# Patient Record
Sex: FEMALE | Race: BLACK OR AFRICAN AMERICAN | NOT HISPANIC OR LATINO | Employment: STUDENT | URBAN - METROPOLITAN AREA
[De-identification: names, ages, dates, MRNs, and addresses within clinical notes are randomized per-mention and may not be internally consistent; named-entity substitution may affect disease eponyms.]

---
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
## 2023-02-21 PROCEDURE — 87491 CHLMYD TRACH DNA AMP PROBE: CPT

## 2023-02-21 PROCEDURE — 87660 TRICHOMONAS VAGIN DIR PROBE: CPT

## 2023-02-21 PROCEDURE — 87480 CANDIDA DNA DIR PROBE: CPT

## 2023-02-21 PROCEDURE — 87591 N.GONORRHOEAE DNA AMP PROB: CPT

## 2023-02-21 PROCEDURE — 87510 GARDNER VAG DNA DIR PROBE: CPT

## 2023-02-23 ENCOUNTER — TELEPHONE (OUTPATIENT)
Dept: URGENT CARE | Facility: CLINIC | Age: 18
End: 2023-02-23
Payer: OTHER GOVERNMENT

## 2023-02-23 DIAGNOSIS — B96.89 BACTERIAL VAGINOSIS: Primary | ICD-10-CM

## 2023-02-23 DIAGNOSIS — N76.0 BACTERIAL VAGINOSIS: Primary | ICD-10-CM

## 2023-02-23 RX ORDER — METRONIDAZOLE 500 MG/1
500 TABLET ORAL 2 TIMES DAILY
Qty: 14 TABLET | Refills: 0 | Status: SHIPPED | OUTPATIENT
Start: 2023-02-23 | End: 2023-03-02

## 2023-02-23 NOTE — TELEPHONE ENCOUNTER
Patient returned call.  Verified patient's identity with date of birth.  I explained the patient that her vaginal swab was positive for Gardnerella vaginalis.  This is an overgrowth of bacteria in the vagina that needs to be treated with an antibiotic.  Patient denies any antibiotic allergies.  I informed her that I will send a prescription of Flagyl 500 mg twice daily for 7 days to her pharmacy on file.  Advised patient to take the prescription in full and follow-up with her primary care provider or the referral that was made to gynecology.  Also inform patient that chlamydia and gonorrhea test were negative.  Candida was negative.  Trichomonas was negative.  Informed patient that testing for HIV, syphilis, and HPV are not performed here at the urgent care and she should see either her primary care provider for follow-up with the gynecology referral to have these tests.  Discussed risk for increasing symptoms and when to seek further care.  F/U with primary care provider discussed.  ER precautions discussed with the patient.  All questions answered and patient verbalized understanding of information.

## 2025-01-31 ENCOUNTER — APPOINTMENT (OUTPATIENT)
Dept: ULTRASOUND IMAGING | Facility: HOSPITAL | Age: 20
End: 2025-01-31
Payer: MEDICAID

## 2025-01-31 ENCOUNTER — HOSPITAL ENCOUNTER (EMERGENCY)
Facility: HOSPITAL | Age: 20
Discharge: HOME | End: 2025-01-31
Attending: EMERGENCY MEDICINE
Payer: MEDICAID

## 2025-01-31 VITALS
BODY MASS INDEX: 23.11 KG/M2 | OXYGEN SATURATION: 99 % | RESPIRATION RATE: 16 BRPM | HEIGHT: 64 IN | WEIGHT: 135.36 LBS | SYSTOLIC BLOOD PRESSURE: 116 MMHG | HEART RATE: 70 BPM | DIASTOLIC BLOOD PRESSURE: 66 MMHG | TEMPERATURE: 98.5 F

## 2025-01-31 DIAGNOSIS — N83.201 CYST OF RIGHT OVARY: Primary | ICD-10-CM

## 2025-01-31 DIAGNOSIS — N93.9 VAGINAL BLEEDING: ICD-10-CM

## 2025-01-31 LAB
BASOPHILS # BLD AUTO: 0.02 10*3/MM3 (ref 0–0.2)
BASOPHILS NFR BLD AUTO: 0.2 % (ref 0–1.5)
DEPRECATED RDW RBC AUTO: 38 FL (ref 37–54)
EOSINOPHIL # BLD AUTO: 0.05 10*3/MM3 (ref 0–0.4)
EOSINOPHIL NFR BLD AUTO: 0.4 % (ref 0.3–6.2)
ERYTHROCYTE [DISTWIDTH] IN BLOOD BY AUTOMATED COUNT: 11.7 % (ref 12.3–15.4)
HCG INTACT+B SERPL-ACNC: <0.5 MIU/ML
HCT VFR BLD AUTO: 37.1 % (ref 34–46.6)
HGB BLD-MCNC: 11.9 G/DL (ref 12–15.9)
HOLD SPECIMEN: NORMAL
HOLD SPECIMEN: NORMAL
IMM GRANULOCYTES # BLD AUTO: 0.03 10*3/MM3 (ref 0–0.05)
IMM GRANULOCYTES NFR BLD AUTO: 0.2 % (ref 0–0.5)
LYMPHOCYTES # BLD AUTO: 2.79 10*3/MM3 (ref 0.7–3.1)
LYMPHOCYTES NFR BLD AUTO: 22.4 % (ref 19.6–45.3)
MCH RBC QN AUTO: 28.8 PG (ref 26.6–33)
MCHC RBC AUTO-ENTMCNC: 32.1 G/DL (ref 31.5–35.7)
MCV RBC AUTO: 89.8 FL (ref 79–97)
MONOCYTES # BLD AUTO: 0.8 10*3/MM3 (ref 0.1–0.9)
MONOCYTES NFR BLD AUTO: 6.4 % (ref 5–12)
NEUTROPHILS NFR BLD AUTO: 70.4 % (ref 42.7–76)
NEUTROPHILS NFR BLD AUTO: 8.79 10*3/MM3 (ref 1.7–7)
NRBC BLD AUTO-RTO: 0 /100 WBC (ref 0–0.2)
PLATELET # BLD AUTO: 292 10*3/MM3 (ref 140–450)
PMV BLD AUTO: 9.1 FL (ref 6–12)
RBC # BLD AUTO: 4.13 10*6/MM3 (ref 3.77–5.28)
WBC NRBC COR # BLD AUTO: 12.48 10*3/MM3 (ref 3.4–10.8)
WHOLE BLOOD HOLD COAG: NORMAL
WHOLE BLOOD HOLD SPECIMEN: NORMAL

## 2025-01-31 PROCEDURE — 85025 COMPLETE CBC W/AUTO DIFF WBC: CPT | Performed by: EMERGENCY MEDICINE

## 2025-01-31 PROCEDURE — 76856 US EXAM PELVIC COMPLETE: CPT

## 2025-01-31 PROCEDURE — 84702 CHORIONIC GONADOTROPIN TEST: CPT | Performed by: EMERGENCY MEDICINE

## 2025-01-31 PROCEDURE — 99284 EMERGENCY DEPT VISIT MOD MDM: CPT

## 2025-01-31 PROCEDURE — 36415 COLL VENOUS BLD VENIPUNCTURE: CPT | Performed by: EMERGENCY MEDICINE

## 2025-01-31 RX ORDER — IBUPROFEN 800 MG/1
800 TABLET, FILM COATED ORAL EVERY 8 HOURS PRN
Qty: 15 TABLET | Refills: 0 | Status: SHIPPED | OUTPATIENT
Start: 2025-01-31

## 2025-01-31 RX ORDER — SODIUM CHLORIDE 0.9 % (FLUSH) 0.9 %
10 SYRINGE (ML) INJECTION AS NEEDED
Status: DISCONTINUED | OUTPATIENT
Start: 2025-01-31 | End: 2025-02-01 | Stop reason: HOSPADM

## 2025-01-31 NOTE — ED TRIAGE NOTES
Patient to ED with vaginal bleeding that started on Sunday. Abnormal Uterine bleeding. Patient states she was supposed to start 2/4, started a week early, and states she is bleeding through a tampon and a pad. She c/o lower right pelvic pain no known gynecological history

## 2025-02-01 NOTE — ED PROVIDER NOTES
Time: 10:05 PM EST  Date of encounter:  1/31/2025  Independent Historian/Clinical History and Information was obtained by:   Patient    History is limited by: N/A    Chief Complaint: Vaginal bleeding, pelvic pain      History of Present Illness:  Patient is a 19 y.o. year old female who presents to the emergency department for evaluation of vaginal bleeding, right-sided pelvic pain.  Symptoms has been ongoing since Sunday.  States that she has been having to change her tampon and pad every 1.5-hour to 2 hours.  States that she is usually regular with her period. She is not due for another 4 days based on her adelso. States that she is currently not sexually active. She is not on any birth control meds. Denies any vaginal discharge.      Patient Care Team  Primary Care Provider: Provider, No Known    Past Medical History:     No Known Allergies  History reviewed. No pertinent past medical history.  Past Surgical History:   Procedure Laterality Date    TONSILLECTOMY       History reviewed. No pertinent family history.    Home Medications:  Prior to Admission medications    Medication Sig Start Date End Date Taking? Authorizing Provider   fluticasone (FLONASE) 50 MCG/ACT nasal spray 2 sprays into the nostril(s) as directed by provider Daily. 2 puffs each nostril 5/10/24   Randell Sandoval MD   loratadine-pseudoephedrine (Claritin-D 12 Hour) 5-120 MG per 12 hr tablet Take 1 tablet by mouth 2 (Two) Times a Day As Needed for Allergies (congestion). 5/10/24   Randell Sandoval MD        Social History:   Social History     Tobacco Use    Smoking status: Never     Passive exposure: Never    Smokeless tobacco: Never   Vaping Use    Vaping status: Never Used   Substance Use Topics    Alcohol use: Never    Drug use: Never         Review of Systems:  Review of Systems   Constitutional:  Negative for chills and fever.   HENT:  Negative for ear pain.    Eyes:  Negative for pain.   Respiratory:  Negative for cough and shortness of  "breath.    Cardiovascular:  Negative for chest pain.   Gastrointestinal:  Negative for abdominal pain, diarrhea, nausea and vomiting.   Genitourinary:  Positive for pelvic pain and vaginal bleeding. Negative for dysuria.   Musculoskeletal:  Negative for arthralgias.   Skin:  Negative for rash.   Neurological:  Negative for headaches.        Physical Exam:  /69   Pulse 75   Temp 98.7 °F (37.1 °C) (Oral)   Resp 18   Ht 162.6 cm (64\")   Wt 61.4 kg (135 lb 5.8 oz)   LMP 01/26/2025 (Exact Date)   SpO2 100%   BMI 23.23 kg/m²     Physical Exam  Vitals and nursing note reviewed.   Constitutional:       Appearance: Normal appearance.   HENT:      Head: Normocephalic and atraumatic.      Nose: Nose normal.   Eyes:      Extraocular Movements: Extraocular movements intact.      Conjunctiva/sclera: Conjunctivae normal.      Pupils: Pupils are equal, round, and reactive to light.   Cardiovascular:      Rate and Rhythm: Normal rate and regular rhythm.      Pulses: Normal pulses.      Heart sounds: Normal heart sounds.   Pulmonary:      Effort: Pulmonary effort is normal.      Breath sounds: Normal breath sounds.   Abdominal:      General: Abdomen is flat.      Palpations: Abdomen is soft.      Tenderness: There is abdominal tenderness in the suprapubic area.   Musculoskeletal:         General: Normal range of motion.      Cervical back: Normal range of motion and neck supple.   Skin:     General: Skin is warm and dry.   Neurological:      General: No focal deficit present.      Mental Status: She is alert and oriented to person, place, and time.   Psychiatric:         Mood and Affect: Mood normal.         Behavior: Behavior normal.         Thought Content: Thought content normal.         Judgment: Judgment normal.                    Medical Decision Making:      Comorbidities that affect care:    None    External Notes reviewed:    None      The following orders were placed and all results were independently analyzed " by me:  Orders Placed This Encounter   Procedures    US Pelvis Complete    [place] Draw    hCG, Quantitative, Pregnancy    CBC Auto Differential    Ambulatory Referral to Obstetrics / Gynecology    NPO Diet NPO Type: Strict NPO    Undress & Gown    Vital Signs    Orthostatic Blood Pressure    Supplies To Bedside - Notify MD When Ready- Pelvic cart / set up    Oxygen Therapy- Nasal Cannula; Titrate 1-6 LPM Per SpO2; 90 - 95%    Insert Peripheral IV    CBC & Differential    Green Top (Gel)    Lavender Top    Gold Top - SST    Light Blue Top       Medications Given in the Emergency Department:  Medications   sodium chloride 0.9 % flush 10 mL (has no administration in time range)        ED Course:         Labs:    Lab Results (last 24 hours)       Procedure Component Value Units Date/Time    CBC & Differential [[id number]]  (Abnormal) Collected: 01/31/25 1905    Specimen: Blood from Arm, Right Updated: 01/31/25 1926    Narrative:      The following orders were created for panel order CBC & Differential.  Procedure                               Abnormality         Status                     ---------                               -----------         ------                     CBC Auto Differential[[id number]]        Abnormal            Final result                 Please view results for these tests on the individual orders.    hCG, Quantitative, Pregnancy [[id number]] Collected: 01/31/25 1905    Specimen: Blood from Arm, Right Updated: 01/31/25 1942     HCG Quantitative <0.50 mIU/mL     Narrative:      HCG Ranges by Gestational Age    Females - non-pregnant premenopausal   </= 1mIU/mL HCG  Females - postmenopausal               </= 7mIU/mL HCG    3 Weeks       5.4   -      72 mIU/mL  4 Weeks      10.2   -     708 mIU/mL  5 Weeks       217   -   8,245 mIU/mL  6 Weeks       152   -  32,177 mIU/mL  7 Weeks     4,059   - 153,767 mIU/mL  8 Weeks    31,366   - 149,094 mIU/mL  9 Weeks    59,109   - 135,901 mIU/mL  10 Weeks    44,186   - 170,409 mIU/mL  12 Weeks   27,107   - 201,615 mIU/mL  14 Weeks   24,302   -  93,646 mIU/mL  15 Weeks   12,540   -  69,747 mIU/mL  16 Weeks    8,904   -  55,332 mIU/mL  17 Weeks    8,240   -  51,793 mIU/mL  18 Weeks    9,649   -  55,271 mIU/mL      CBC Auto Differential [[id number]]  (Abnormal) Collected: 01/31/25 1905    Specimen: Blood from Arm, Right Updated: 01/31/25 1926     WBC 12.48 10*3/mm3      RBC 4.13 10*6/mm3      Hemoglobin 11.9 g/dL      Hematocrit 37.1 %      MCV 89.8 fL      MCH 28.8 pg      MCHC 32.1 g/dL      RDW 11.7 %      RDW-SD 38.0 fl      MPV 9.1 fL      Platelets 292 10*3/mm3      Neutrophil % 70.4 %      Lymphocyte % 22.4 %      Monocyte % 6.4 %      Eosinophil % 0.4 %      Basophil % 0.2 %      Immature Grans % 0.2 %      Neutrophils, Absolute 8.79 10*3/mm3      Lymphocytes, Absolute 2.79 10*3/mm3      Monocytes, Absolute 0.80 10*3/mm3      Eosinophils, Absolute 0.05 10*3/mm3      Basophils, Absolute 0.02 10*3/mm3      Immature Grans, Absolute 0.03 10*3/mm3      nRBC 0.0 /100 WBC              Imaging:    US Pelvis Complete    Result Date: 1/31/2025  US PELVIS COMPLETE Date of Exam: 1/31/2025 8:25 PM EST Indication: vaginal bleed. Comparison: No comparisons available. Technique: Transabdominal ultrasound evaluation of the pelvis was performed utilizing grayscale and color Doppler technique.  Doppler spectral analysis was performed. Findings: Patient declined transvaginal imaging. The uterus measures 7.8 x 5.3 x 3.4 cm. The uterus appears mildly heterogeneous without focal parenchymal abnormality. There is a cystic structure adjacent to the uterus, which appears within the right adnexa measuring 2.6 x 2.3 x 1.7 cm and may represent a paraovarian cyst. Heterogenous material within the endometrium without significant vascular flow, likely representing blood products. The right ovary measures 4.3 x 2.8 x 2.4 cm and the left ovary measures 3 x 1.9 x 1.7 cm. Ovarian vascularity appears  intact.  There is no evidence of a solid ovarian mass. Right ovarian cyst measuring up to 3 cm, likely physiologic. There is no significant free fluid identified within the pelvis.     Impression: Transabdominal imaging performed. Patient declined transvaginal imaging. Heterogenous material within the endometrium without significant vascular flow, likely representing blood products. Cystic structure adjacent to the uterus, likely within the right adnexa measuring up to 2.6 cm, may represent a paraovarian cyst. Right ovarian cyst measuring up to 3 cm, likely physiologic. Intact appearing ovarian vascularity. Electronically Signed: Nahun Bobby  1/31/2025 9:35 PM EST  Workstation ID: [id number]       Differential Diagnosis and Discussion:    Pelvic Pain: Differential diagnosis includes but is not limited to ectopic pregnancy, ovarian torsion, dysmenorrhea, tubo-ovarian abscess, ovarian cyst, ovulation, oophoritis, abdominal pregnancy, appendicitis, diverticulitis, cystitis, and renal colic  Vaginal Bleeding: Differential diagnosis includes but is not limited to foreign body, tumor, vaginitis, dysfunctional uterine bleeding, endocrine abnormalities, coagulation disorder, systemic illness, polyps, complications of pregnancy (possible ectopic pregnancy).    PROCEDURES:    Labs were collected in the emergency department and all labs were reviewed and interpreted by me.  Ultrasound was performed in the emergency department and the ultrasound impression was interpreted by me.     No orders to display       Procedures    MDM     Amount and/or Complexity of Data Reviewed  Clinical lab tests: reviewed  Tests in the radiology section of CPT®: reviewed    Risk of Complications, Morbidity, and/or Mortality  Presenting problems: moderate  Diagnostic procedures: moderate  Management options: low    Patient Progress  Patient progress: stable    Patient presents to the emergency department for evaluation of vaginal bleeding,  right-sided pelvic pain.  Symptoms has been ongoing since Sunday.  States that she has been having to change her tampon and pad every 1.5-hour to 2 hours.  States that she is usually regular with her period. She is not due for another 4 days based on her adelso. States that she is currently not sexually active. She is not on any birth control meds. Denies any vaginal discharge.    CBC shows a mildly elevated white count of 12.48.  Rest of the CBC is unremarkable.  hCG is negative.    US Pelvis Complete   Final Result   Impression:   Transabdominal imaging performed. Patient declined transvaginal imaging.      Heterogenous material within the endometrium without significant vascular flow, likely representing blood products.      Cystic structure adjacent to the uterus, likely within the right adnexa measuring up to 2.6 cm, may represent a paraovarian cyst.      Right ovarian cyst measuring up to 3 cm, likely physiologic. Intact appearing ovarian vascularity.      Electronically Signed: Nahun Bobby     1/31/2025 9:35 PM EST     Workstation ID: [id number]        Discussed imaging findings with the patient.    Patient's exam symptoms are consistent with an ovarian cyst.  Will send referral to OB/GYN.  Will discharge the patient with ibuprofen for pain.    Follow up with your Primary Care Provider in 3-5 days especially if symptoms worsen.                  Patient Care Considerations:    ANTIBIOTICS: I considered prescribing antibiotics as an outpatient however no bacterial focus of infection was found.      Consultants/Shared Management Plan:    None    Social Determinants of Health:    Patient is independent, reliable, and has access to care.       Disposition and Care Coordination:    Discharged: The patient is suitable and stable for discharge with no need for consideration of admission.    I have explained the patient´s condition, diagnoses and treatment plan based on the information available to me at this time. I  have answered questions and addressed any concerns. The patient has a good  understanding of the patient´s diagnosis, condition, and treatment plan as can be expected at this point. The vital signs have been stable. The patient´s condition is stable and appropriate for discharge from the emergency department.      The patient will pursue further outpatient evaluation with the primary care physician or other designated or consulting physician as outlined in the discharge instructions. They are agreeable to this plan of care and follow-up instructions have been explained in detail. The patient has received these instructions in written format and has expressed an understanding of the discharge instructions. The patient is aware that any significant change in condition or worsening of symptoms should prompt an immediate return to this or the closest emergency department or call to 1.  I have explained discharge medications and the need for follow up with the patient/caretakers. This was also printed in the discharge instructions. Patient was discharged with the following medications and follow up:      Medication List        New Prescriptions      ibuprofen 800 MG tablet  Commonly known as: ADVIL,MOTRIN  Take 1 tablet by mouth Every 8 (Eight) Hours As Needed for Mild Pain.               Where to Get Your Medications        These medications were sent to [id number] - [place], [place] - [address] AT [facility] [phone]  - [phone] FX  [address] [place] [address]      Phone: [phone]   ibuprofen 800 MG tablet      No follow-up provider specified.     Final diagnoses:   Vaginal bleeding   Cyst of right ovary        ED Disposition       ED Disposition   Discharge    Condition   Stable    Comment   --               This medical record created using voice recognition software.             Bharathi Enamorado PA  01/31/25 [id number]

## 2025-02-01 NOTE — DISCHARGE INSTRUCTIONS
Ultrasound of your pelvis shows a 3 cm ovarian cyst.  This may be causing your vaginal bleeding.  You are being discharged home with ibuprofen for abdominal pain.  Take this as needed.  I have sent a referral to OB/GYN.  They will reach out to you in 1-2 business days for an appointment.    Follow up with your Primary Care Provider in 3-5 days especially if symptoms worsen.    Return to the Emergency Department if you develop any uncontrollable fever, intractable pain, nausea, vomiting.

## 2025-02-03 ENCOUNTER — APPOINTMENT (OUTPATIENT)
Dept: ULTRASOUND IMAGING | Facility: HOSPITAL | Age: 20
End: 2025-02-03
Payer: MEDICAID

## 2025-02-03 ENCOUNTER — HOSPITAL ENCOUNTER (EMERGENCY)
Facility: HOSPITAL | Age: 20
Discharge: HOME | End: 2025-02-04
Attending: EMERGENCY MEDICINE | Admitting: EMERGENCY MEDICINE
Payer: MEDICAID

## 2025-02-03 VITALS
DIASTOLIC BLOOD PRESSURE: 70 MMHG | HEART RATE: 86 BPM | RESPIRATION RATE: 16 BRPM | WEIGHT: 135.36 LBS | BODY MASS INDEX: 23.11 KG/M2 | OXYGEN SATURATION: 100 % | SYSTOLIC BLOOD PRESSURE: 110 MMHG | HEIGHT: 64 IN | TEMPERATURE: 98.5 F

## 2025-02-03 DIAGNOSIS — R10.2 PELVIC PAIN IN FEMALE: Primary | ICD-10-CM

## 2025-02-03 DIAGNOSIS — M79.9 SOFT TISSUE LESION OF PELVIC REGION: ICD-10-CM

## 2025-02-03 LAB
ALBUMIN SERPL-MCNC: 4.6 G/DL (ref 3.5–5.2)
ALBUMIN/GLOB SERPL: 1.2 G/DL
ALP SERPL-CCNC: 117 U/L (ref 39–117)
ALT SERPL W P-5'-P-CCNC: 8 U/L (ref 1–33)
ANION GAP SERPL CALCULATED.3IONS-SCNC: 13.3 MMOL/L (ref 5–15)
AST SERPL-CCNC: 15 U/L (ref 1–32)
BACTERIA UR QL AUTO: ABNORMAL /HPF
BASOPHILS # BLD AUTO: 0.05 10*3/MM3 (ref 0–0.2)
BASOPHILS NFR BLD AUTO: 0.3 % (ref 0–1.5)
BILIRUB SERPL-MCNC: 0.2 MG/DL (ref 0–1.2)
BILIRUB UR QL STRIP: NEGATIVE
BUN SERPL-MCNC: 10 MG/DL (ref 6–20)
BUN/CREAT SERPL: 9.7 (ref 7–25)
CALCIUM SPEC-SCNC: 9.8 MG/DL (ref 8.6–10.5)
CHLORIDE SERPL-SCNC: 101 MMOL/L (ref 98–107)
CLARITY UR: ABNORMAL
CO2 SERPL-SCNC: 24.7 MMOL/L (ref 22–29)
COLOR UR: ABNORMAL
CREAT SERPL-MCNC: 1.03 MG/DL (ref 0.57–1)
DEPRECATED RDW RBC AUTO: 38.8 FL (ref 37–54)
EGFRCR SERPLBLD CKD-EPI 2021: 79.5 ML/MIN/1.73
EOSINOPHIL # BLD AUTO: 0.1 10*3/MM3 (ref 0–0.4)
EOSINOPHIL NFR BLD AUTO: 0.7 % (ref 0.3–6.2)
ERYTHROCYTE [DISTWIDTH] IN BLOOD BY AUTOMATED COUNT: 11.8 % (ref 12.3–15.4)
GLOBULIN UR ELPH-MCNC: 4 GM/DL
GLUCOSE SERPL-MCNC: 69 MG/DL (ref 65–99)
GLUCOSE UR STRIP-MCNC: NEGATIVE MG/DL
HCG INTACT+B SERPL-ACNC: <0.5 MIU/ML
HCT VFR BLD AUTO: 41 % (ref 34–46.6)
HGB BLD-MCNC: 13.1 G/DL (ref 12–15.9)
HGB UR QL STRIP.AUTO: ABNORMAL
HOLD SPECIMEN: NORMAL
HOLD SPECIMEN: NORMAL
HYALINE CASTS UR QL AUTO: ABNORMAL /LPF
IMM GRANULOCYTES # BLD AUTO: 0.06 10*3/MM3 (ref 0–0.05)
IMM GRANULOCYTES NFR BLD AUTO: 0.4 % (ref 0–0.5)
KETONES UR QL STRIP: ABNORMAL
LEUKOCYTE ESTERASE UR QL STRIP.AUTO: ABNORMAL
LYMPHOCYTES # BLD AUTO: 3.59 10*3/MM3 (ref 0.7–3.1)
LYMPHOCYTES NFR BLD AUTO: 24.8 % (ref 19.6–45.3)
MCH RBC QN AUTO: 28.9 PG (ref 26.6–33)
MCHC RBC AUTO-ENTMCNC: 32 G/DL (ref 31.5–35.7)
MCV RBC AUTO: 90.3 FL (ref 79–97)
MONOCYTES # BLD AUTO: 0.94 10*3/MM3 (ref 0.1–0.9)
MONOCYTES NFR BLD AUTO: 6.5 % (ref 5–12)
NEUTROPHILS NFR BLD AUTO: 67.3 % (ref 42.7–76)
NEUTROPHILS NFR BLD AUTO: 9.76 10*3/MM3 (ref 1.7–7)
NITRITE UR QL STRIP: NEGATIVE
NRBC BLD AUTO-RTO: 0 /100 WBC (ref 0–0.2)
PH UR STRIP.AUTO: 5.5 [PH] (ref 5–8)
PLATELET # BLD AUTO: 372 10*3/MM3 (ref 140–450)
PMV BLD AUTO: 9.1 FL (ref 6–12)
POTASSIUM SERPL-SCNC: 4.2 MMOL/L (ref 3.5–5.2)
PROT SERPL-MCNC: 8.6 G/DL (ref 6–8.5)
PROT UR QL STRIP: ABNORMAL
RBC # BLD AUTO: 4.54 10*6/MM3 (ref 3.77–5.28)
RBC # UR STRIP: ABNORMAL /HPF
REF LAB TEST METHOD: ABNORMAL
SODIUM SERPL-SCNC: 139 MMOL/L (ref 136–145)
SP GR UR STRIP: >1.03 (ref 1–1.03)
SQUAMOUS #/AREA URNS HPF: ABNORMAL /HPF
UROBILINOGEN UR QL STRIP: ABNORMAL
WBC # UR STRIP: ABNORMAL /HPF
WBC NRBC COR # BLD AUTO: 14.5 10*3/MM3 (ref 3.4–10.8)
WHOLE BLOOD HOLD COAG: NORMAL
WHOLE BLOOD HOLD SPECIMEN: NORMAL

## 2025-02-03 PROCEDURE — 99284 EMERGENCY DEPT VISIT MOD MDM: CPT

## 2025-02-03 PROCEDURE — 36415 COLL VENOUS BLD VENIPUNCTURE: CPT

## 2025-02-03 PROCEDURE — 80053 COMPREHEN METABOLIC PANEL: CPT

## 2025-02-03 PROCEDURE — 85025 COMPLETE CBC W/AUTO DIFF WBC: CPT

## 2025-02-03 PROCEDURE — 81001 URINALYSIS AUTO W/SCOPE: CPT

## 2025-02-03 PROCEDURE — 84702 CHORIONIC GONADOTROPIN TEST: CPT

## 2025-02-03 PROCEDURE — 87086 URINE CULTURE/COLONY COUNT: CPT

## 2025-02-03 PROCEDURE — 76830 TRANSVAGINAL US NON-OB: CPT

## 2025-02-03 RX ORDER — SODIUM CHLORIDE 0.9 % (FLUSH) 0.9 %
10 SYRINGE (ML) INJECTION AS NEEDED
Status: DISCONTINUED | OUTPATIENT
Start: 2025-02-03 | End: 2025-02-04 | Stop reason: HOSPADM

## 2025-02-03 NOTE — Clinical Note
[facility] EMERGENCY ROOM  [address] [place] [address]  Phone: [phone]  Fax: [phone]    YOLY JOHNSONSECA accompanied Vania Jung to the emergency department on 2/3/2025. They may return to work on 02/05/2025.        Thank you for choosing [facility].    Radha Gilman, RN

## 2025-02-03 NOTE — Clinical Note
[facility] EMERGENCY ROOM  [address] [place] [address]  Phone: [phone]  Fax: [phone]    Vania Jung was seen and treated in our emergency department on 2/3/2025.  She may return to work on 02/05/2025.         Thank you for choosing [facility].    Mary Remy APRN

## 2025-02-04 ENCOUNTER — TELEPHONE (OUTPATIENT)
Dept: OBSTETRICS AND GYNECOLOGY | Facility: CLINIC | Age: 20
End: 2025-02-04
Payer: MEDICAID

## 2025-02-04 PROCEDURE — 96372 THER/PROPH/DIAG INJ SC/IM: CPT

## 2025-02-04 PROCEDURE — 25010000002 KETOROLAC TROMETHAMINE PER 15 MG: Performed by: NURSE PRACTITIONER

## 2025-02-04 RX ORDER — DIFLUNISAL 500 MG/1
500 TABLET, FILM COATED ORAL 2 TIMES DAILY
Qty: 30 TABLET | Refills: 0 | Status: SHIPPED | OUTPATIENT
Start: 2025-02-04

## 2025-02-04 RX ORDER — KETOROLAC TROMETHAMINE 30 MG/ML
INJECTION, SOLUTION INTRAMUSCULAR; INTRAVENOUS
Status: DISCONTINUED
Start: 2025-02-04 | End: 2025-02-04 | Stop reason: HOSPADM

## 2025-02-04 RX ORDER — KETOROLAC TROMETHAMINE 30 MG/ML
30 INJECTION, SOLUTION INTRAMUSCULAR; INTRAVENOUS ONCE
Status: COMPLETED | OUTPATIENT
Start: 2025-02-04 | End: 2025-02-04

## 2025-02-04 RX ADMIN — KETOROLAC TROMETHAMINE 30 MG: 30 INJECTION, SOLUTION INTRAMUSCULAR; INTRAVENOUS at 01:11

## 2025-02-04 NOTE — DISCHARGE INSTRUCTIONS
Your blood work and ultrasound today was still not concerning for any emergent condition and looked very similar to your visit on the 31st.  A referral to an OB/GYN was placed at that visit and it may take them a few days to get an appointment set up for you to follow-up with someone they will contact you to get that appointment set up for you.  You can stop the previously prescribed Motrin and take the medicine as prescribed today.  You may take over-the-counter acetaminophen 975 mg every 4 hours with this medication as needed for pain.  Drink plenty of fluids.  Follow-up with your OB/GYN as directed.  Return to the emergency department immediately for any acutely worsening and persistent pelvic pain, any syncopal episodes, any fevers of 101 or greater or any new or worse concerns.

## 2025-02-04 NOTE — TELEPHONE ENCOUNTER
Patient called in checking the status of her referral from her ER visit on 01/31/25. recommendations

## 2025-02-04 NOTE — ED PROVIDER NOTES
Time: 7:49 PM EST  Date of encounter:  2/3/2025  Independent Historian/Clinical History and Information was obtained by:   Patient    History is limited by: N/A    Chief Complaint   Patient presents with    Vaginal Bleeding     PT REPORTS VAGINAL BLEEDING AND CRAMPING FOR 8 DAYS. PT WAS SEEN ON FRIDAY FOR OVARIAN CYST AND WAS TOLD TO COME BACK IF VAGINAL BLEEDING CONTINUED.         History of Present Illness:    Patient is a 19 y.o. year old female who presents to the emergency department for evaluation of vaginal bleeding and cramping.  Patient states that she was seen here 9 days ago and diagnosed with an ovarian cyst and was to follow-up with OB however she has not heard from the office.  Patient continues to have heavy bleeding, she is saturating 2 peripads an hour.(Bailey Seaver, APRN, FNP-C)    Patient presents today stating that she was seen here on January 31, 2025 for pelvic pain and abnormal vaginal bleeding.  She states that her symptoms are the same and have not worsened but was told by her nurse that day to come back on Monday of her bleeding had not stopped.  She states that she is not having any new or worsening symptoms.  She denies any syncope.  She denies any shortness of breath.  She states she has had no fevers.  She denies any urinary symptoms.  She reports no CVA tenderness.  She states she is having some mild lower pelvic tenderness but has no rebound or guarding.  A referral was placed for an OB/GYN follow-up but she states that they did not contact her today.    Patient Care Team  Primary Care Provider: Provider, No Known    Past Medical History:     No Known Allergies  No past medical history on file.  Past Surgical History:   Procedure Laterality Date    TONSILLECTOMY       No family history on file.    Home Medications:  Prior to Admission medications    Medication Sig Start Date End Date Taking? Authorizing Provider   fluticasone (FLONASE) 50 MCG/ACT nasal spray 2 sprays into the  "nostril(s) as directed by provider Daily. 2 puffs each nostril 5/10/24   Randell Sandoval MD   ibuprofen (ADVIL,MOTRIN) 800 MG tablet Take 1 tablet by mouth Every 8 (Eight) Hours As Needed for Mild Pain. 1/31/25   Bharathi Enamorado PA   loratadine-pseudoephedrine (Claritin-D 12 Hour) 5-120 MG per 12 hr tablet Take 1 tablet by mouth 2 (Two) Times a Day As Needed for Allergies (congestion). 5/10/24   Randell Sandoval MD        Social History:   Social History     Tobacco Use    Smoking status: Never     Passive exposure: Never    Smokeless tobacco: Never   Vaping Use    Vaping status: Never Used   Substance Use Topics    Alcohol use: Never    Drug use: Never         Review of Systems:  Review of Systems   Constitutional:  Negative for chills and fever.   HENT:  Negative for congestion, ear pain and sore throat.    Eyes:  Negative for pain.   Respiratory:  Negative for cough, chest tightness and shortness of breath.    Cardiovascular:  Negative for chest pain.   Gastrointestinal:  Negative for abdominal pain, diarrhea, nausea and vomiting.   Genitourinary:  Positive for pelvic pain and vaginal bleeding. Negative for dysuria, flank pain, frequency, hematuria, urgency and vaginal discharge.   Musculoskeletal:  Negative for back pain, joint swelling, neck pain and neck stiffness.   Skin:  Negative for pallor and rash.   Neurological:  Negative for seizures and headaches.   All other systems reviewed and are negative.       Physical Exam:  /70 (BP Location: Left arm, Patient Position: Sitting)   Pulse 86   Temp 98.5 °F (36.9 °C) (Oral)   Resp 16   Ht 162.6 cm (64\")   Wt 61.4 kg (135 lb 5.8 oz)   LMP 01/26/2025 (Exact Date)   SpO2 100%   BMI 23.23 kg/m²         Physical Exam  Vitals and nursing note reviewed.   Constitutional:       General: She is not in acute distress.     Appearance: Normal appearance. She is not ill-appearing or toxic-appearing.   HENT:      Head: Normocephalic and atraumatic.   Eyes:      " General: No scleral icterus.     Conjunctiva/sclera: Conjunctivae normal.      Pupils: Pupils are equal, round, and reactive to light.   Cardiovascular:      Rate and Rhythm: Normal rate and regular rhythm.      Pulses: Normal pulses.   Pulmonary:      Effort: Pulmonary effort is normal. No respiratory distress.      Breath sounds: Normal breath sounds.   Abdominal:      General: Abdomen is flat. There is no distension.      Palpations: Abdomen is soft.      Tenderness: There is abdominal tenderness. There is no right CVA tenderness, left CVA tenderness, guarding or rebound.   Musculoskeletal:         General: Normal range of motion.      Cervical back: Normal range of motion.   Skin:     General: Skin is warm and dry.      Capillary Refill: Capillary refill takes less than 2 seconds.      Findings: No rash.   Neurological:      General: No focal deficit present.      Mental Status: She is alert and oriented to person, place, and time. Mental status is at baseline.   Psychiatric:         Mood and Affect: Mood normal.         Behavior: Behavior normal.                          Medical Decision Making:      Comorbidities that affect care:    None    External Notes reviewed:    Previous ED Note: Previous ED visit from 1/31/2025.  Previous ultrasound from 1/31/2025.      The following orders were placed and all results were independently analyzed by me:  Orders Placed This Encounter   Procedures    Urine Culture - Urine,    US Non-ob Transvaginal    [place] Draw    hCG, Quantitative, Pregnancy    CBC Auto Differential    Comprehensive Metabolic Panel    Urinalysis With Microscopic If Indicated (No Culture) - Urine, Clean Catch    Urinalysis, Microscopic Only - Urine, Clean Catch    NPO Diet NPO Type: Strict NPO    Undress & Gown    Vital Signs    Orthostatic Blood Pressure    Supplies To Bedside - Notify MD When Ready- Pelvic cart / set up    Oxygen Therapy- Nasal Cannula; Titrate 1-6 LPM Per SpO2; 90 - 95%    Insert  Peripheral IV    CBC & Differential    Green Top (Gel)    Lavender Top    Gold Top - SST    Light Blue Top       Medications Given in the Emergency Department:  Medications   sodium chloride 0.9 % flush 10 mL (has no administration in time range)   ketorolac (TORADOL) injection 30 mg (has no administration in time range)        ED Course:    The patient was initially evaluated in the triage area where orders were placed. The patient was later dispositioned by JULIA Blackmon.      The patient was advised to stay for completion of workup which includes but is not limited to communication of labs and radiological results, reassessment and plan. The patient was advised that leaving prior to disposition by a provider could result in critical findings that are not communicated to the patient.     ED Course as of 02/04/25 0059   Mon Feb 03, 2025 1951 PROVIDER IN TRIAGE  Patient was evaluated by me in triage, Bailey Seaver, APRN, [facility].  Orders were placed and patient is currently awaiting final results and disposition.   [AS]   Tue Feb 04, 2025   0003   Study Result    Narrative & Impression  US PELVIS COMPLETE     Date of Exam: 1/31/2025 8:25 PM EST     Indication: vaginal bleed.     Comparison: No comparisons available.     Technique: Transabdominal ultrasound evaluation of the pelvis was performed utilizing grayscale and color Doppler technique.  Doppler spectral analysis was performed.        Findings:  Patient declined transvaginal imaging.     The uterus measures 7.8 x 5.3 x 3.4 cm. The uterus appears mildly heterogeneous without focal parenchymal abnormality. There is a cystic structure adjacent to the uterus, which appears within the right adnexa measuring 2.6 x 2.3 x 1.7 cm and may   represent a paraovarian cyst. Heterogenous material within the endometrium without significant vascular flow, likely representing blood products.     The right ovary measures 4.3 x 2.8 x 2.4 cm and the left ovary measures 3 x  1.9 x 1.7 cm. Ovarian vascularity appears intact.  There is no evidence of a solid ovarian mass. Right ovarian cyst measuring up to 3 cm, likely physiologic.     There is no significant free fluid identified within the pelvis.        IMPRESSION:  Impression:  Transabdominal imaging performed. Patient declined transvaginal imaging.     Heterogenous material within the endometrium without significant vascular flow, likely representing blood products.     Cystic structure adjacent to the uterus, likely within the right adnexa measuring up to 2.6 cm, may represent a paraovarian cyst.     Right ovarian cyst measuring up to 3 cm, likely physiologic. Intact appearing ovarian vascularity.     Electronically Signed: Nahun Bobby    1/31/2025 9:35 PM EST    Workstation ID: [id number]     [TC]      ED Course User Index  [AS] Seaver, Alyce B, APRN  [TC] Mary Remy APRN       Labs:    Lab Results (last 24 hours)       Procedure Component Value Units Date/Time    CBC & Differential [[id number]]  (Abnormal) Collected: 02/03/25 1958    Specimen: Blood from Arm, Left Updated: 02/03/25 2010    Narrative:      The following orders were created for panel order CBC & Differential.  Procedure                               Abnormality         Status                     ---------                               -----------         ------                     CBC Auto Differential[[id number]]        Abnormal            Final result                 Please view results for these tests on the individual orders.    hCG, Quantitative, Pregnancy [[id number]] Collected: 02/03/25 1958    Specimen: Blood from Arm, Left Updated: 02/03/25 2034     HCG Quantitative <0.50 mIU/mL     Narrative:      HCG Ranges by Gestational Age    Females - non-pregnant premenopausal   </= 1mIU/mL HCG  Females - postmenopausal               </= 7mIU/mL HCG    3 Weeks       5.4   -      72 mIU/mL  4 Weeks      10.2   -     708 mIU/mL  5 Weeks       217   -   8,245  mIU/mL  6 Weeks       152   -  32,177 mIU/mL  7 Weeks     4,059   - 153,767 mIU/mL  8 Weeks    31,366   - 149,094 mIU/mL  9 Weeks    59,109   - 135,901 mIU/mL  10 Weeks   44,186   - 170,409 mIU/mL  12 Weeks   27,107   - 201,615 mIU/mL  14 Weeks   24,302   -  93,646 mIU/mL  15 Weeks   12,540   -  69,747 mIU/mL  16 Weeks    8,904   -  55,332 mIU/mL  17 Weeks    8,240   -  51,793 mIU/mL  18 Weeks    9,649   -  55,271 mIU/mL      CBC Auto Differential [[id number]]  (Abnormal) Collected: 02/03/25 1958    Specimen: Blood from Arm, Left Updated: 02/03/25 2010     WBC 14.50 10*3/mm3      RBC 4.54 10*6/mm3      Hemoglobin 13.1 g/dL      Hematocrit 41.0 %      MCV 90.3 fL      MCH 28.9 pg      MCHC 32.0 g/dL      RDW 11.8 %      RDW-SD 38.8 fl      MPV 9.1 fL      Platelets 372 10*3/mm3      Neutrophil % 67.3 %      Lymphocyte % 24.8 %      Monocyte % 6.5 %      Eosinophil % 0.7 %      Basophil % 0.3 %      Immature Grans % 0.4 %      Neutrophils, Absolute 9.76 10*3/mm3      Lymphocytes, Absolute 3.59 10*3/mm3      Monocytes, Absolute 0.94 10*3/mm3      Eosinophils, Absolute 0.10 10*3/mm3      Basophils, Absolute 0.05 10*3/mm3      Immature Grans, Absolute 0.06 10*3/mm3      nRBC 0.0 /100 WBC     Comprehensive Metabolic Panel [[id number]]  (Abnormal) Collected: 02/03/25 1958    Specimen: Blood from Arm, Left Updated: 02/03/25 2041     Glucose 69 mg/dL      BUN 10 mg/dL      Creatinine 1.03 mg/dL      Sodium 139 mmol/L      Potassium 4.2 mmol/L      Chloride 101 mmol/L      CO2 24.7 mmol/L      Calcium 9.8 mg/dL      Total Protein 8.6 g/dL      Albumin 4.6 g/dL      ALT (SGPT) 8 U/L      AST (SGOT) 15 U/L      Alkaline Phosphatase 117 U/L      Total Bilirubin 0.2 mg/dL      Globulin 4.0 gm/dL      A/G Ratio 1.2 g/dL      BUN/Creatinine Ratio 9.7     Anion Gap 13.3 mmol/L      eGFR 79.5 mL/min/1.73     Narrative:      GFR Categories in Chronic Kidney Disease (CKD)      GFR Category          GFR (mL/min/1.73)     Interpretation  G1                     90 or greater         Normal or high (1)  G2                      60-89                Mild decrease (1)  G3a                   45-59                Mild to moderate decrease  G3b                   30-44                Moderate to severe decrease  G4                    15-29                Severe decrease  G5                    14 or less           Kidney failure          (1)In the absence of evidence of kidney disease, neither GFR category G1 or G2 fulfill the criteria for CKD.    eGFR calculation 2021 CKD-EPI creatinine equation, which does not include race as a factor    Urinalysis With Microscopic If Indicated (No Culture) - Urine, Clean Catch [[id number]]  (Abnormal) Collected: 02/03/25 2008    Specimen: Urine, Clean Catch Updated: 02/03/25 2035     Color, UA Dark Yellow     Appearance, UA Cloudy     pH, UA 5.5     Specific Gravity, UA >1.030     Glucose, UA Negative     Ketones, UA Trace     Bilirubin, UA Negative     Blood, UA Large (3+)     Protein,  mg/dL (2+)     Leuk Esterase, UA Small (1+)     Nitrite, UA Negative     Urobilinogen, UA 1.0 E.U./dL    Urinalysis, Microscopic Only - Urine, Clean Catch [[id number]]  (Abnormal) Collected: 02/03/25 2008    Specimen: Urine, Clean Catch Updated: 02/03/25 2035     RBC, UA Too Numerous to Count /HPF      WBC, UA 11-20 /HPF      Bacteria, UA None Seen /HPF      Squamous Epithelial Cells, UA 3-6 /HPF      Hyaline Casts, UA 3-6 /LPF      Methodology Automated Microscopy    Urine Culture - Urine, Urine, Clean Catch [[id number]] Collected: 02/03/25 2008    Specimen: Urine, Clean Catch Updated: 02/03/25 2129             Imaging:    US Non-ob Transvaginal    Result Date: 2/3/2025  US NON-OB TRANSVAGINAL Date of Exam: 2/3/2025 9:05 PM EST Indication: vaginal bleeding-not pregnant. Comparison: 1/31/2025 Technique: Transvaginal pelvic ultrasound was performed.  Grayscale and color Doppler imaging was utilized to evaluate the pelvic  area with image documentation per protocol.  Doppler spectral analysis was performed. Findings: Right ovary measures 4.8 x 2.5 x 2.7 cm. Left ovary measures 3.9 x 2.3 x 2.3 cm. Both ovaries show perfusion by Doppler. There are bilateral small ovarian follicles. Uterus measures 4.5 x 8.0 x 3.8 cm. Myometrium is homogeneous. Endometrial stripe is normal at 3 mm in caliber. Again seen is a simple cystic lesion adjacent to the lower uterus measuring 2.4 x 2.4 x 2.6 cm. This remains nonspecific but could reflect a paraovarian cyst. No solid adnexal masses. No free fluid.     1.No evidence of ovarian torsion. 2.2.6 cm simple cystic lesion adjacent to the lower uterus. This remains nonspecific but could reflect a paraovarian cyst. 3.Normal uterus and endometrial stripe. Electronically Signed: Howard Morocho MD  2/3/2025 9:43 PM EST  Workstation ID: [id number]       Differential Diagnosis and Discussion:      Vaginal Bleeding: Differential diagnosis includes but is not limited to foreign body, tumor, vaginitis, dysfunctional uterine bleeding, endocrine abnormalities, coagulation disorder, systemic illness, polyps, complications of pregnancy (possible ectopic pregnancy).    PROCEDURES:    Labs were collected in the emergency department and all labs were reviewed and interpreted by me.  Ultrasound was performed in the emergency department and the ultrasound impression was interpreted by me.     No orders to display        Procedures    MDM  Number of Diagnoses or Management Options  Pelvic pain in female: established and worsening  Soft tissue lesion of pelvic region: established and worsening     Amount and/or Complexity of Data Reviewed  Clinical lab tests: reviewed  Tests in the radiology section of CPT®: reviewed    Risk of Complications, Morbidity, and/or Mortality  Presenting problems: low  Diagnostic procedures: low  Management options: low    Patient Progress  Patient progress: stable     Patient Care  Considerations:    ANTIBIOTICS: I considered prescribing antibiotics as an outpatient however no bacterial focus of infection was found.      Consultants/Shared Management Plan:    None    Social Determinants of Health:    Patient is independent, reliable, and has access to care.       Disposition and Care Coordination:    Discharged: The patient is suitable and stable for discharge with no need for consideration of admission.    I have explained the patient´s condition, diagnoses and treatment plan based on the information available to me at this time. I have answered questions and addressed any concerns. The patient has a good  understanding of the patient´s diagnosis, condition, and treatment plan as can be expected at this point. The vital signs have been stable. The patient´s condition is stable and appropriate for discharge from the emergency department.      The patient will pursue further outpatient evaluation with the primary care physician or other designated or consulting physician as outlined in the discharge instructions. They are agreeable to this plan of care and follow-up instructions have been explained in detail. The patient has received these instructions in written format and has expressed an understanding of the discharge instructions. The patient is aware that any significant change in condition or worsening of symptoms should prompt an immediate return to this or the closest emergency department or call to 911.  I have explained discharge medications and the need for follow up with the patient/caretakers. This was also printed in the discharge instructions. Patient was discharged with the following medications and follow up:      Medication List        New Prescriptions      diflunisal 500 MG tablet tablet  Commonly known as: DOLOBID  Take 1 tablet by mouth 2 (Two) Times a Day.            Stop      ibuprofen 800 MG tablet  Commonly known as: ADVIL,MOTRIN               Where to Get Your  Medications        These medications were sent to [facility]/pharmacy #[id number] - [place], [place] - [address] - [phone]  - [phone]  [address] [place] [address]      Hours: 24-hours Phone: [phone]   diflunisal 500 MG tablet tablet      No follow-up provider specified.     Final diagnoses:   Pelvic pain in female   Soft tissue lesion of pelvic region        ED Disposition       ED Disposition   Discharge    Condition   Stable    Comment   --               This medical record created using voice recognition software.             Mary Remy, APRN  02/04/25 0059

## 2025-02-05 ENCOUNTER — TELEPHONE (OUTPATIENT)
Dept: OBSTETRICS AND GYNECOLOGY | Facility: CLINIC | Age: 20
End: 2025-02-05

## 2025-02-05 LAB — BACTERIA SPEC AEROBE CULT: NO GROWTH

## 2025-02-05 NOTE — PROGRESS NOTES
GYN Visit    Chief Complaint   Patient presents with    ER FU Ovarian Cyst     Pelvic pain, currently spotting       HPI:   19 y.o. LMP: Patient's last menstrual period was 2025 (exact date).     Social History     Substance and Sexual Activity   Sexual Activity Not Currently    Partners: Male    Birth control/protection: Condom     US Pelvis Complete (2025 20:50)  Transabdominal only cystic structure next to the uterus within the right adnexa 2.6 cm suspected paraovarian cyst.  Right ovary cyst 3 cm.    US Non-ob Transvaginal (2025 21:37)  Right ovary cyst resolved.  Simple cystic lesion adjacent to the lower uterus measuring 2.6 cm.  Nonspecific could reflect a paraovarian cyst.  Normal ovaries bilaterally.      ED Provider Notes by Mary Remy APRN (2025 19:49)  Seen in the emergency room for vaginal bleeding and cramping for 8 days.  Pregnancy test was negative.  CBC showed hematocrit of 41 normal platelets.  Slightly elevated white count at 14.5.  Was afebrile.  CMP within normal limits.  Urinalysis specific gravity more than 1.030, trace ketones, large blood, small leukocyte esterase, negative nitrite consistent with contamination with 3-6 epithelial cells.  Too numerous to count red blood cells, and 11-20 white blood cells.  Urine culture was negative.    Mom w pt today and pt agrees to have her present for hx and exam.     Sexually active: in past, last sex 1-2mo ago.  Does not want BC.    Menarche: 11yo, menses usually nl flow and monthly.     Seen in ER for pain and bleeding for 2weeks .  Bleeding is better.  Pain still there.  Intermittent.RLQ.    Comes 12-15x/day, lasts hours.  Worse w sitting.  Better hot tea and hot shower/heat pad.  Never tried dolobid from ER.  Pain did get up to 10/10.  Now 3/10.  Never had issues w VB before this month.     ROS: No F/C/N/V/C. No vag dc, no vag itch or odor. + occas diarrhea and decreased appetite      History: PMHx, Meds,  "Allergies, PSHx, Social Hx, and POBHx all reviewed and updated.    PHYSICAL EXAM:  /73   Pulse 94   Ht 162.6 cm (64\")   Wt 61.2 kg (135 lb)   LMP 01/26/2025 (Exact Date)   Breastfeeding No   BMI 23.17 kg/m²   Facility age limit for growth %nancie is 20 years.   Chaperone present during breast and/or pelvic exam if performed.  General- NAD, alert and oriented, appropriate  Psych- Normal mood, good memory    Abdomen- Soft, non distended, non tender, no masses.  No rebound, no guarding.    External genitalia- Normal female, no lesions  Urethra/meatus- Normal, no masses, non tender  Bladder- Normal, no masses, non tender  Vagina- Normal, no lesions, no discharge, small amount old blood in vault.    Cervix- Normal, no lesions, no discharge, No cervical motion tenderness.  Small amt bleeding from os, cw light menses.  Uterus- Normal size, shape & consistency.  Non tender, mobile.   Adnexa- Tender on RIGHT up to 6/10, VA 2/10.  No mass.  Left no mass, NT.   Anus/Rectum/Perineum- Not performed    Lymphatic- No palpable groin nodes  Extremities- No edema, no cyanosis    Skin- No lesions, no rashes      ASSESSMENT AND PLAN:  Diagnoses and all orders for this visit:    1. Pelvic pain (Primary)  Assessment & Plan:  Today we discussed differential diagnosis of pelvic pain, associated with AUB, I suspect it is a ruptured ovarian cyst.  We will recheck a pregnancy test to ensure it is still negative.  I have encouraged OCPs to assist with cycle control and prevent ovarian cysts, along with contraception.  She would prefer to avoid birth control at this point.  We reviewed her ultrasound in detail, I do suspect a para tubal cyst.  At this point benefits of surgery do not outweigh risks, patient agrees.  She desires expectant management at this point.      2. Abnormal uterine bleeding (AUB)  -     TSH  -     T4, Free  -     Prolactin  -     hCG, Quantitative, Pregnancy    3. Screen for STD (sexually transmitted disease)  - "     Chlamydia trachomatis, Neisseria gonorrhoeae, PCR - Swab, Cervix    4. Birth control counseling  Comments:  declines    5. Cervical cancer screening  -     PAP, Liquid Based ([facility] Only)    6. Counseling for HPV (human papillomavirus) vaccination  Comments:  Information provided, it has been recommended.  Patient will call office if desired            Follow Up:  Return in about 1 month (around 3/7/2025) for FU pain and bleeding.          Sameera Burns,   02/07/2025    [facility]  [facility] [place] [address]  Dept: [phone]  Dept Fax: [phone]  Loc: [phone]  Loc Fax: [phone]

## 2025-02-07 ENCOUNTER — OFFICE VISIT (OUTPATIENT)
Dept: OBSTETRICS AND GYNECOLOGY | Facility: CLINIC | Age: 20
End: 2025-02-07

## 2025-02-07 VITALS
BODY MASS INDEX: 23.05 KG/M2 | DIASTOLIC BLOOD PRESSURE: 73 MMHG | HEIGHT: 64 IN | WEIGHT: 135 LBS | HEART RATE: 94 BPM | SYSTOLIC BLOOD PRESSURE: 112 MMHG

## 2025-02-07 DIAGNOSIS — N93.9 ABNORMAL UTERINE BLEEDING (AUB): ICD-10-CM

## 2025-02-07 DIAGNOSIS — Z71.85 COUNSELING FOR HPV (HUMAN PAPILLOMAVIRUS) VACCINATION: ICD-10-CM

## 2025-02-07 DIAGNOSIS — R10.2 PELVIC PAIN: Primary | ICD-10-CM

## 2025-02-07 DIAGNOSIS — Z12.4 CERVICAL CANCER SCREENING: ICD-10-CM

## 2025-02-07 DIAGNOSIS — Z30.09 BIRTH CONTROL COUNSELING: ICD-10-CM

## 2025-02-07 DIAGNOSIS — Z11.3 SCREEN FOR STD (SEXUALLY TRANSMITTED DISEASE): ICD-10-CM

## 2025-02-07 LAB
C TRACH RRNA CVX QL NAA+PROBE: DETECTED
HCG INTACT+B SERPL-ACNC: <1 MIU/ML
N GONORRHOEA RRNA SPEC QL NAA+PROBE: NOT DETECTED
PROLACTIN SERPL-MCNC: 4.75 NG/ML (ref 4.79–23.3)
T4 FREE SERPL-MCNC: 0.99 NG/DL (ref 0.92–1.68)
TSH SERPL DL<=0.05 MIU/L-ACNC: 0.76 UIU/ML (ref 0.27–4.2)

## 2025-02-07 PROCEDURE — 87591 N.GONORRHOEAE DNA AMP PROB: CPT | Performed by: OBSTETRICS & GYNECOLOGY

## 2025-02-07 PROCEDURE — 84439 ASSAY OF FREE THYROXINE: CPT | Performed by: OBSTETRICS & GYNECOLOGY

## 2025-02-07 PROCEDURE — 84702 CHORIONIC GONADOTROPIN TEST: CPT | Performed by: OBSTETRICS & GYNECOLOGY

## 2025-02-07 PROCEDURE — G0123 SCREEN CERV/VAG THIN LAYER: HCPCS | Performed by: OBSTETRICS & GYNECOLOGY

## 2025-02-07 PROCEDURE — 84443 ASSAY THYROID STIM HORMONE: CPT | Performed by: OBSTETRICS & GYNECOLOGY

## 2025-02-07 PROCEDURE — 84146 ASSAY OF PROLACTIN: CPT | Performed by: OBSTETRICS & GYNECOLOGY

## 2025-02-07 PROCEDURE — 87491 CHLMYD TRACH DNA AMP PROBE: CPT | Performed by: OBSTETRICS & GYNECOLOGY

## 2025-02-07 NOTE — ASSESSMENT & PLAN NOTE
Today we discussed differential diagnosis of pelvic pain, associated with AUB, I suspect it is a ruptured ovarian cyst.  We will recheck a pregnancy test to ensure it is still negative.  I have encouraged OCPs to assist with cycle control and prevent ovarian cysts, along with contraception.  She would prefer to avoid birth control at this point.  We reviewed her ultrasound in detail, I do suspect a para tubal cyst.  At this point benefits of surgery do not outweigh risks, patient agrees.  She desires expectant management at this point.

## 2025-02-09 RX ORDER — AZITHROMYCIN 500 MG/1
1000 TABLET, FILM COATED ORAL ONCE
Qty: 2 TABLET | Refills: 0 | Status: SHIPPED | OUTPATIENT
Start: 2025-02-09 | End: 2025-02-09

## 2025-02-09 RX ORDER — ONDANSETRON 4 MG/1
4 TABLET, ORALLY DISINTEGRATING ORAL EVERY 8 HOURS PRN
Qty: 10 TABLET | Refills: 0 | Status: SHIPPED | OUTPATIENT
Start: 2025-02-09

## 2025-02-11 LAB
CYTOLOGIST CVX/VAG CYTO: NORMAL
CYTOLOGY CVX/VAG DOC CYTO: NORMAL
DX ICD CODE: NORMAL
OTHER STN SPEC: NORMAL
SERVICE CMNT-IMP: NORMAL
STAT OF ADQ CVX/VAG CYTO-IMP: NORMAL

## 2025-03-10 NOTE — PROGRESS NOTES
GYN Visit    Chief Complaint   Patient presents with    Follow-up       HPI:   19 y.o. LMP: Patient's last menstrual period was 2025 (approximate).     Social History     Substance and Sexual Activity   Sexual Activity Not Currently    Partners: Male    Birth control/protection: Condom     hCG, Quantitative, Pregnancy (2025 15:04)  Prolactin (2025 15:04)  T4, Free (2025 15:04)  TSH (2025 15:04)    PAP, Liquid Based ([facility] Only) (2025 14:40)    Chlamydia trachomatis, Neisseria gonorrhoeae, PCR - Swab, Cervix (2025 14:40)    Progress Notes by Sameera Burns DO (2025 14:00)  Seen in February for pelvic pain and AUB workup positive for chlamydia.  S/p abx.  Partner tx'd.  Both pain and bleeding resolved.     History: PMHx, Meds, Allergies, PSHx, Social Hx, and POBHx all reviewed and updated.    PHYSICAL EXAM:  /64   Wt 61.2 kg (135 lb)   LMP 2025 (Approximate)   BMI 23.17 kg/m²   Facility age limit for growth %nancie is 20 years.   Chaperone present during breast and/or pelvic exam if performed.  General- NAD, alert and oriented, appropriate  Psych- Normal mood, good memory    External genitalia- Normal female, no lesions  Urethra/meatus- Normal, no masses, non tender  Bladder- Normal, no masses, non tender  Vagina- Normal, no atrophy, no lesions, no discharge   Prolapse : none noted   Cervix- Normal, no lesions, no discharge, No cervical motion tenderness     Extremities- No edema, no cyanosis    Skin- No lesions, no rashes      ASSESSMENT AND PLAN:  Diagnoses and all orders for this visit:    1. Acute genitourinary Chlamydia trachomatis infection (Primary)  -     Chlamydia trachomatis, Neisseria gonorrhoeae, PCR - Swab, Cervix    2. Pelvic pain  Comments:  resolved    3. Abnormal uterine bleeding (AUB)  Comments:  resolved  Overview:  2025 nl TFTs prolactin, + CT- treated,  FU KURTIS recommended      4. Screen for STD (sexually transmitted disease)  -      Chlamydia trachomatis, Neisseria gonorrhoeae, PCR - Swab, Cervix  -     HIV-1 & HIV-2 Antibodies  -     Hepatitis B Surface Antigen  -     Hepatitis C Antibody  -     RPR, Rfx Qn RPR / Confirm TP  -     Gardnerella vaginalis, Trichomonas vaginalis, Candida albicans, DNA - Swab, Vagina            Follow Up:  Return in about 1 year (around 3/12/2026) for WWE sooner prn any return of AUB/or pain.          Sameera Burns,   03/12/2025    [facility]  [facility] [place] [address]  Dept: [phone]  Dept Fax: [phone]  Loc: [phone]  Loc Fax: [phone]

## 2025-03-12 ENCOUNTER — OFFICE VISIT (OUTPATIENT)
Dept: OBSTETRICS AND GYNECOLOGY | Facility: CLINIC | Age: 20
End: 2025-03-12
Payer: COMMERCIAL

## 2025-03-12 VITALS — WEIGHT: 135 LBS | BODY MASS INDEX: 23.17 KG/M2 | DIASTOLIC BLOOD PRESSURE: 64 MMHG | SYSTOLIC BLOOD PRESSURE: 104 MMHG

## 2025-03-12 DIAGNOSIS — Z11.3 SCREEN FOR STD (SEXUALLY TRANSMITTED DISEASE): ICD-10-CM

## 2025-03-12 DIAGNOSIS — N93.9 ABNORMAL UTERINE BLEEDING (AUB): ICD-10-CM

## 2025-03-12 DIAGNOSIS — A56.2 ACUTE GENITOURINARY CHLAMYDIA TRACHOMATIS INFECTION: Primary | ICD-10-CM

## 2025-03-12 DIAGNOSIS — R10.2 PELVIC PAIN: ICD-10-CM

## 2025-03-12 LAB
C TRACH RRNA CVX QL NAA+PROBE: NOT DETECTED
CANDIDA SPECIES: NEGATIVE
GARDNERELLA VAGINALIS: POSITIVE
HBV SURFACE AG SERPL QL IA: NORMAL
HCV AB SER QL: NORMAL
HIV 1+2 AB+HIV1 P24 AG SERPL QL IA: NORMAL
N GONORRHOEA RRNA SPEC QL NAA+PROBE: NOT DETECTED
T VAGINALIS DNA VAG QL PROBE+SIG AMP: NEGATIVE

## 2025-03-12 PROCEDURE — 87340 HEPATITIS B SURFACE AG IA: CPT | Performed by: OBSTETRICS & GYNECOLOGY

## 2025-03-12 PROCEDURE — G0432 EIA HIV-1/HIV-2 SCREEN: HCPCS | Performed by: OBSTETRICS & GYNECOLOGY

## 2025-03-12 PROCEDURE — 87491 CHLMYD TRACH DNA AMP PROBE: CPT | Performed by: OBSTETRICS & GYNECOLOGY

## 2025-03-12 PROCEDURE — 87660 TRICHOMONAS VAGIN DIR PROBE: CPT | Performed by: OBSTETRICS & GYNECOLOGY

## 2025-03-12 PROCEDURE — 87510 GARDNER VAG DNA DIR PROBE: CPT | Performed by: OBSTETRICS & GYNECOLOGY

## 2025-03-12 PROCEDURE — 87480 CANDIDA DNA DIR PROBE: CPT | Performed by: OBSTETRICS & GYNECOLOGY

## 2025-03-12 PROCEDURE — 86592 SYPHILIS TEST NON-TREP QUAL: CPT | Performed by: OBSTETRICS & GYNECOLOGY

## 2025-03-12 PROCEDURE — 86803 HEPATITIS C AB TEST: CPT | Performed by: OBSTETRICS & GYNECOLOGY

## 2025-03-12 PROCEDURE — 87591 N.GONORRHOEAE DNA AMP PROB: CPT | Performed by: OBSTETRICS & GYNECOLOGY

## 2025-03-13 ENCOUNTER — RESULTS FOLLOW-UP (OUTPATIENT)
Dept: OBSTETRICS AND GYNECOLOGY | Facility: CLINIC | Age: 20
End: 2025-03-13
Payer: COMMERCIAL

## 2025-03-13 RX ORDER — METRONIDAZOLE 500 MG/1
500 TABLET ORAL 2 TIMES DAILY
Qty: 14 TABLET | Refills: 0 | Status: SHIPPED | OUTPATIENT
Start: 2025-03-13

## 2025-03-13 NOTE — TELEPHONE ENCOUNTER
I discussed the result of her BV/Yeast swab and also went over the medication instructions.  The pt understood all the information I gave her and also inquired about her other labs. I let her know that the only one we are still waiting on is the RPR, all other labs were normal/negative.  I told her that when the RPR comes back we will only call if it is negative.  The pt understood this information as well.

## 2025-03-14 LAB — RPR SER QL: NON REACTIVE
